# Patient Record
Sex: MALE | Race: OTHER | HISPANIC OR LATINO | URBAN - METROPOLITAN AREA
[De-identification: names, ages, dates, MRNs, and addresses within clinical notes are randomized per-mention and may not be internally consistent; named-entity substitution may affect disease eponyms.]

---

## 2020-02-04 ENCOUNTER — EMERGENCY (EMERGENCY)
Facility: HOSPITAL | Age: 45
LOS: 1 days | Discharge: HOME | End: 2020-02-04
Attending: EMERGENCY MEDICINE | Admitting: EMERGENCY MEDICINE
Payer: MEDICAID

## 2020-02-04 VITALS
HEART RATE: 75 BPM | DIASTOLIC BLOOD PRESSURE: 97 MMHG | RESPIRATION RATE: 16 BRPM | TEMPERATURE: 98 F | SYSTOLIC BLOOD PRESSURE: 166 MMHG | OXYGEN SATURATION: 100 %

## 2020-02-04 LAB
ALBUMIN SERPL ELPH-MCNC: 4.1 G/DL — SIGNIFICANT CHANGE UP (ref 3.3–5)
ALBUMIN SERPL ELPH-MCNC: 4.2 G/DL — SIGNIFICANT CHANGE UP (ref 3.3–5)
ALP SERPL-CCNC: 52 U/L — SIGNIFICANT CHANGE UP (ref 40–120)
ALP SERPL-CCNC: 61 U/L — SIGNIFICANT CHANGE UP (ref 40–120)
ALT FLD-CCNC: 36 U/L — SIGNIFICANT CHANGE UP (ref 4–41)
ALT FLD-CCNC: SIGNIFICANT CHANGE UP U/L (ref 4–41)
ANION GAP SERPL CALC-SCNC: 15 MMO/L — HIGH (ref 7–14)
ANION GAP SERPL CALC-SCNC: 16 MMO/L — HIGH (ref 7–14)
AST SERPL-CCNC: 39 U/L — SIGNIFICANT CHANGE UP (ref 4–40)
AST SERPL-CCNC: SIGNIFICANT CHANGE UP U/L (ref 4–40)
BASOPHILS # BLD AUTO: 0.12 K/UL — SIGNIFICANT CHANGE UP (ref 0–0.2)
BASOPHILS NFR BLD AUTO: 1.2 % — SIGNIFICANT CHANGE UP (ref 0–2)
BILIRUB SERPL-MCNC: 0.4 MG/DL — SIGNIFICANT CHANGE UP (ref 0.2–1.2)
BILIRUB SERPL-MCNC: 0.4 MG/DL — SIGNIFICANT CHANGE UP (ref 0.2–1.2)
BUN SERPL-MCNC: 12 MG/DL — SIGNIFICANT CHANGE UP (ref 7–23)
BUN SERPL-MCNC: 12 MG/DL — SIGNIFICANT CHANGE UP (ref 7–23)
CALCIUM SERPL-MCNC: 9.5 MG/DL — SIGNIFICANT CHANGE UP (ref 8.4–10.5)
CALCIUM SERPL-MCNC: 9.6 MG/DL — SIGNIFICANT CHANGE UP (ref 8.4–10.5)
CHLORIDE SERPL-SCNC: 101 MMOL/L — SIGNIFICANT CHANGE UP (ref 98–107)
CHLORIDE SERPL-SCNC: 103 MMOL/L — SIGNIFICANT CHANGE UP (ref 98–107)
CO2 SERPL-SCNC: 16 MMOL/L — LOW (ref 22–31)
CO2 SERPL-SCNC: 21 MMOL/L — LOW (ref 22–31)
CREAT SERPL-MCNC: 0.79 MG/DL — SIGNIFICANT CHANGE UP (ref 0.5–1.3)
CREAT SERPL-MCNC: 0.95 MG/DL — SIGNIFICANT CHANGE UP (ref 0.5–1.3)
EOSINOPHIL # BLD AUTO: 0.15 K/UL — SIGNIFICANT CHANGE UP (ref 0–0.5)
EOSINOPHIL NFR BLD AUTO: 1.5 % — SIGNIFICANT CHANGE UP (ref 0–6)
GLUCOSE SERPL-MCNC: 112 MG/DL — HIGH (ref 70–99)
GLUCOSE SERPL-MCNC: 83 MG/DL — SIGNIFICANT CHANGE UP (ref 70–99)
HBA1C BLD-MCNC: 5.6 % — SIGNIFICANT CHANGE UP (ref 4–5.6)
HCT VFR BLD CALC: 49.1 % — SIGNIFICANT CHANGE UP (ref 39–50)
HGB BLD-MCNC: 16.3 G/DL — SIGNIFICANT CHANGE UP (ref 13–17)
IMM GRANULOCYTES NFR BLD AUTO: 0.4 % — SIGNIFICANT CHANGE UP (ref 0–1.5)
LYMPHOCYTES # BLD AUTO: 2.39 K/UL — SIGNIFICANT CHANGE UP (ref 1–3.3)
LYMPHOCYTES # BLD AUTO: 23.1 % — SIGNIFICANT CHANGE UP (ref 13–44)
MCHC RBC-ENTMCNC: 28.6 PG — SIGNIFICANT CHANGE UP (ref 27–34)
MCHC RBC-ENTMCNC: 33.2 % — SIGNIFICANT CHANGE UP (ref 32–36)
MCV RBC AUTO: 86.1 FL — SIGNIFICANT CHANGE UP (ref 80–100)
MONOCYTES # BLD AUTO: 0.84 K/UL — SIGNIFICANT CHANGE UP (ref 0–0.9)
MONOCYTES NFR BLD AUTO: 8.1 % — SIGNIFICANT CHANGE UP (ref 2–14)
NEUTROPHILS # BLD AUTO: 6.79 K/UL — SIGNIFICANT CHANGE UP (ref 1.8–7.4)
NEUTROPHILS NFR BLD AUTO: 65.7 % — SIGNIFICANT CHANGE UP (ref 43–77)
NRBC # FLD: 0 K/UL — SIGNIFICANT CHANGE UP (ref 0–0)
PLATELET # BLD AUTO: 321 K/UL — SIGNIFICANT CHANGE UP (ref 150–400)
PMV BLD: 9.4 FL — SIGNIFICANT CHANGE UP (ref 7–13)
POTASSIUM SERPL-MCNC: 4.2 MMOL/L — SIGNIFICANT CHANGE UP (ref 3.5–5.3)
POTASSIUM SERPL-MCNC: SIGNIFICANT CHANGE UP MMOL/L (ref 3.5–5.3)
POTASSIUM SERPL-SCNC: 4.2 MMOL/L — SIGNIFICANT CHANGE UP (ref 3.5–5.3)
POTASSIUM SERPL-SCNC: SIGNIFICANT CHANGE UP MMOL/L (ref 3.5–5.3)
PROT SERPL-MCNC: 6.9 G/DL — SIGNIFICANT CHANGE UP (ref 6–8.3)
PROT SERPL-MCNC: SIGNIFICANT CHANGE UP G/DL (ref 6–8.3)
RBC # BLD: 5.7 M/UL — SIGNIFICANT CHANGE UP (ref 4.2–5.8)
RBC # FLD: 12.6 % — SIGNIFICANT CHANGE UP (ref 10.3–14.5)
SODIUM SERPL-SCNC: 133 MMOL/L — LOW (ref 135–145)
SODIUM SERPL-SCNC: 139 MMOL/L — SIGNIFICANT CHANGE UP (ref 135–145)
TROPONIN T, HIGH SENSITIVITY: < 6 NG/L — SIGNIFICANT CHANGE UP (ref ?–14)
WBC # BLD: 10.33 K/UL — SIGNIFICANT CHANGE UP (ref 3.8–10.5)
WBC # FLD AUTO: 10.33 K/UL — SIGNIFICANT CHANGE UP (ref 3.8–10.5)

## 2020-02-04 PROCEDURE — 99218: CPT

## 2020-02-04 PROCEDURE — 70450 CT HEAD/BRAIN W/O DYE: CPT | Mod: 26

## 2020-02-04 RX ORDER — SODIUM CHLORIDE 9 MG/ML
1000 INJECTION INTRAMUSCULAR; INTRAVENOUS; SUBCUTANEOUS ONCE
Refills: 0 | Status: COMPLETED | OUTPATIENT
Start: 2020-02-04 | End: 2020-02-04

## 2020-02-04 RX ORDER — MECLIZINE HCL 12.5 MG
50 TABLET ORAL ONCE
Refills: 0 | Status: COMPLETED | OUTPATIENT
Start: 2020-02-04 | End: 2020-02-04

## 2020-02-04 RX ORDER — ACETAMINOPHEN 500 MG
650 TABLET ORAL ONCE
Refills: 0 | Status: COMPLETED | OUTPATIENT
Start: 2020-02-04 | End: 2020-02-04

## 2020-02-04 RX ORDER — SODIUM CHLORIDE 9 MG/ML
1000 INJECTION INTRAMUSCULAR; INTRAVENOUS; SUBCUTANEOUS
Refills: 0 | Status: DISCONTINUED | OUTPATIENT
Start: 2020-02-04 | End: 2020-02-11

## 2020-02-04 RX ORDER — METOCLOPRAMIDE HCL 10 MG
10 TABLET ORAL ONCE
Refills: 0 | Status: COMPLETED | OUTPATIENT
Start: 2020-02-04 | End: 2020-02-04

## 2020-02-04 RX ADMIN — Medication 650 MILLIGRAM(S): at 22:45

## 2020-02-04 RX ADMIN — Medication 650 MILLIGRAM(S): at 18:25

## 2020-02-04 RX ADMIN — Medication 650 MILLIGRAM(S): at 19:30

## 2020-02-04 RX ADMIN — Medication 10 MILLIGRAM(S): at 18:25

## 2020-02-04 RX ADMIN — SODIUM CHLORIDE 125 MILLILITER(S): 9 INJECTION INTRAMUSCULAR; INTRAVENOUS; SUBCUTANEOUS at 18:25

## 2020-02-04 RX ADMIN — SODIUM CHLORIDE 1000 MILLILITER(S): 9 INJECTION INTRAMUSCULAR; INTRAVENOUS; SUBCUTANEOUS at 15:28

## 2020-02-04 RX ADMIN — Medication 650 MILLIGRAM(S): at 21:50

## 2020-02-04 RX ADMIN — Medication 50 MILLIGRAM(S): at 18:25

## 2020-02-04 NOTE — ED ADULT NURSE NOTE - CHIEF COMPLAINT QUOTE
Patient BIB EMS for dizziness x5 days, worsens when moving hid head side to side.  Patient ambulatory with steady gait, negative limb ataxia, pronator drift, slurred speech.  Denies PMH.  ECG completed.

## 2020-02-04 NOTE — ED PROVIDER NOTE - ATTENDING CONTRIBUTION TO CARE
DR. MAE, ATTENDING MD-  I have reviewed and discussed the medical student’s documentation and findings with the student. After personally examining the patient, my findings have been added to this documentation.

## 2020-02-04 NOTE — ED PROVIDER NOTE - NS ED ROS FT
CONSTITUTIONAL: No weakness, fevers or chills  EYES/ENT: No visual changes;  No throat pain   NECK: Pain at the back of his head an in his neck   RESPIRATORY: No cough, wheezing, hemoptysis; No shortness of breath  CARDIOVASCULAR: No chest pain, no palpitations  GASTROINTESTINAL: No abdominal or epigastric pain. No nausea, vomiting, or hematemesis; No diarrhea or constipation. No melena or hematochezia.  GENITOURINARY: No dysuria, frequency or hematuria  NEUROLOGICAL: No numbness or weakness  SKIN: No itching, rashes

## 2020-02-04 NOTE — ED CDU PROVIDER INITIAL DAY NOTE - OBJECTIVE STATEMENT
ID # [id number]:  45 y/o male with no pmhx presents to ED c/o gradual onset headache x 4 days and dizziness today.  ID # [id number]:  45 y/o male with no pmhx presents to ED c/o gradual onset headache x 4 days and dizziness today. Describes "room spinning" since 7am this morning that has been constant. Worse with sitting up. + n/v. Posterior headache. No fever, chills, neck pain, cp, sob, abd pain, weakness, numbness, tingling, ataxia, ear pain, family hx of CVA, worst headache of his life. +smoker, about 2-3 cigarettes per day.

## 2020-02-04 NOTE — ED PROVIDER NOTE - CLINICAL SUMMARY MEDICAL DECISION MAKING FREE TEXT BOX
43 yo M with Hx of headaches p/w headache that is different that prior headaches with dizziness and a feeling that he is diverting to the left. Will check head CT, labs, pain control. Will obtain neuro consult and send the pt to the CDU for MRI/MRA to evaluate for posterior CVA vs. mass.

## 2020-02-04 NOTE — ED ADULT NURSE NOTE - OBJECTIVE STATEMENT
Pt is primarily Moroccan speaking. Able to communicate with patient in Moroccan as RW RN. Pt is awake/alert. Aox4, ambulatory at baseline. Pt has been having dizziness x 5 days and having difficulty sleeping. Also c/o lower neck pain. No neuro deficits at this time. Speech clear. Face appears symmetrical. Denies pmhx. Pt evaluated by MD and neurologist. To be placed in CDU for MRI.

## 2020-02-04 NOTE — ED ADULT TRIAGE NOTE - CHIEF COMPLAINT QUOTE
Patient BIB EMS for dizziness x5 days, worsens when moving hid head side to side.  Patient ambulatory with steady gait, negative limb ataxia, pronator drift, slurred speech.  Denies PMH. Patient BIB EMS for dizziness x5 days, worsens when moving hid head side to side.  Patient ambulatory with steady gait, negative limb ataxia, pronator drift, slurred speech.  Denies PMH.  ECG completed.

## 2020-02-04 NOTE — ED CDU PROVIDER INITIAL DAY NOTE - ATTENDING CONTRIBUTION TO CARE
Dr. Monaco:  I performed a face to face bedside interview with patient regarding history of present illness, review of symptoms and past medical history. I completed an independent physical exam.  I have discussed patient's plan of care with PA.   I agree with note as stated above, having amended the EMR as needed to reflect my findings.   This includes HISTORY OF PRESENT ILLNESS, HIV, PAST MEDICAL/SURGICAL/FAMILY/SOCIAL HISTORY, ALLERGIES AND HOME MEDICATIONS, REVIEW OF SYSTEMS, PHYSICAL EXAM, and any PROGRESS NOTES during the time I functioned as the attending physician for this patient.    44M presented to ED with dizziness x 1 day.  Pt endorses occipital headache and posterior neck pain over past several days (different than usual frontal headaches).  Today, had episode of feeling severe dizziness described as feeling mostly off balance.  +Associated N/V.  Now symptoms eased off but still feeling "woozy" as though on a boat.  When ambulating, feels like he is tipping left.  Denies fever/chills, cp, sob, tinnitus, ear pain, current n/v.    Exam:  - nad  - rrr  - ctab  - abd soft ntnd  - no focal neuro deficits, fatiguable horizontal nystagmus, stable gait, negative Romberg sign.    A/P  - vertigo, r/o central cause  - CDU for MRI  - f/u neuro recs Dr. Monaco:  I performed a face to face bedside interview with patient regarding history of present illness, review of symptoms and past medical history. I completed an independent physical exam.  I have discussed patient's plan of care with PA.   I agree with note as stated above, having amended the EMR as needed to reflect my findings.   This includes HISTORY OF PRESENT ILLNESS, HIV, PAST MEDICAL/SURGICAL/FAMILY/SOCIAL HISTORY, ALLERGIES AND HOME MEDICATIONS, REVIEW OF SYSTEMS, PHYSICAL EXAM, and any PROGRESS NOTES during the time I functioned as the attending physician for this patient.    44M presented to ED with dizziness x 1 day.  Pt endorses occipital headache and posterior neck pain over past several days (different than usual frontal headaches).  Today, had episode of feeling severe dizziness described as feeling mostly off balance.  +Associated N/V.  Now symptoms eased off but still feeling "woozy" as though on a boat.  When ambulating, feels like he is tipping left.  Denies fever/chills, cp, sob, tinnitus, ear pain, current n/v    Exam:  - nad  - rrr  - ctab  - abd soft ntnd  - no focal neuro deficits, fatiguable horizontal nystagmus, stable gait, negative Romberg sign.    A/P  - vertigo, r/o central cause  - CDU for MRI  - f/u neuro recs

## 2020-02-04 NOTE — CONSULT NOTE ADULT - ATTENDING COMMENTS
45 y/o male with past medical history of migraines presents to the ED with complaint of dizziness, neck pain and headache for the past 5 days.   Pt received reglan and tylenol in the CDU. This morning reports that neck pain has completely resolved, still has some minor right sided headache which is usual for his migraines. He reports getting HA about every 3 days, takes 2 Excedrin for the pain.    Neurological exam unremarkable.     MRI brain and c-spine negative.     Pt with intractable migraine.   will start topamax 50mg BID for headache ppx treatment.   Flexeril 5mg 1-2 tabs PRN neck pain.    Follow up in neurology clinic at [place] [phone]

## 2020-02-04 NOTE — ED CDU PROVIDER INITIAL DAY NOTE - ENMT, MLM
Airway patent. Nasal mucosa clear. Mouth with normal mucosa. Throat has no vesicles, no oropharyngeal exudates and uvula is midline. Airway patent. Nasal mucosa clear. Mouth with normal mucosa. Throat has no vesicles, no oropharyngeal exudates and uvula is midline. Ears: Grey TMs b/l.

## 2020-02-04 NOTE — CONSULT NOTE ADULT - ASSESSMENT
45 y/o male with past medical history of migraines presents to the ED with complaint of dizziness and headache. Patient reports that for the past 5 days, he developed a posterior neck pain that radiated up to the back of his head rate 6/10 in severity. Patient reports the headache was not accompanied with any nausea, vomiting, photophobia, or phonophobia. Patient also reports there no positional component to the headache and that he found moderate relief with ibuprofen. However, on 2/4 morning around 7:30am, patient had a 2-3 minute episode of severe lightheadedness while working that caused him to have to sit down. During this episode, patient reports he felt nauseous and vomited x2. After this, patient was noted to feel off-balance when ambulating but did not have room spinning dizziness. Patient's family reports patient has been drifting to the left while ambulating since then. Patient does endorse prior history of migraines but denies ever having associated dizziness or feeling off balance as a result.   Currently, denies any changes in vision, problems speaking, numbness/tingling, unilateral weakness, facial numbness, double vision, problems swallowing. Denies any preceding trauma, heavy lifting, straining, or MVC prior to headache onset.   Neurologic exam unremarkable except for +slightly wide based gait a/w L lateropulsion noted on exam. Patient's VSS also noted to be 166/97 in ED despite no prior history of HTN.   CTH unremarkable for acute pathology.    Impression: Headache a/w L lateropulsion possibly 2/2 L cerebellar dysfunction; possibly ischemic stroke 2/2 cryptogenic etiology. Would concurrently evaluate the vasculature given atypical location from patient's prior headaches with concern (though less likely) for vascular dissection. Basilar migraine would be less likely and, additionally, a diagnosis of exclusion in this case.     Recommendations:  [] MRI brain w/o contrast  [] MRA head w/o contrast  [] MRA neck w/ contrast  [] IVF  [] Reglan 10mg IV Q8H PRN for nausea  [] IV Tylenol for headache  [] Observation Unit

## 2020-02-04 NOTE — ED CDU PROVIDER INITIAL DAY NOTE - MEDICAL DECISION MAKING DETAILS
45 y/o male with no pmhx presents to ED c/o gradual onset headache x 4 days and dizziness today. Describes "room spinning" since 7am this morning that has been constant. Worse with sitting up. + n/v. Posterior headache. ct head negative, pt c/o persistent symptoms. pt did not received medication, will try tylenol, meclizine and reglan. sent to cdu for r/o cva MRI, neuro following.

## 2020-02-04 NOTE — CONSULT NOTE ADULT - SUBJECTIVE AND OBJECTIVE BOX
Neurology Consult Note    HPI: 43 y/o male with past medical history of migraines presents to the ED with complaint of dizziness and headache. Patient reports that for the past 5 days, he developed a posterior neck pain that radiated up to the back of his head rate 6/10 in severity. Patient reports the headache was not accompanied with any nausea, vomiting, photophobia, or phonophobia. Patient also reports there no positional component to the headache and that he found moderate relief with ibuprofen. However, on 2/4 morning around 7:30am, patient had a 2-3 minute episode of severe lightheadedness while working that caused him to have to sit down. During this episode, patient reports he felt nauseous and vomited x2. After this, patient was noted to feel off-balance when ambulating but did not have room spinning dizziness. Patient's family reports patient has been drifting to the left while ambulating since then. Patient does endorse prior history of migraines but denies ever having associated dizziness or feeling off balance as a result.   Currently, denies any changes in vision, problems speaking, numbness/tingling, unilateral weakness, facial numbness, double vision, problems swallowing.       MEDICATIONS  (STANDING):  sodium chloride 0.9% Bolus 1000 milliLiter(s) IV Bolus once    MEDICATIONS  (PRN):    PAST MEDICAL & SURGICAL HISTORY:    FAMILY HISTORY:    Allergies    No Known Allergies    Intolerances    Review of Systems:  As per HPI, otherwise negative.     Vital Signs Last 24 Hrs  T(C): 36.9 (04 Feb 2020 13:11), Max: 36.9 (04 Feb 2020 13:11)  T(F): 98.5 (04 Feb 2020 13:11), Max: 98.5 (04 Feb 2020 13:11)  HR: 77 (04 Feb 2020 13:11) (75 - 77)  BP: 145/90 (04 Feb 2020 13:11) (145/90 - 166/97)  BP(mean): --  RR: 16 (04 Feb 2020 13:11) (16 - 16)  SpO2: 99% (04 Feb 2020 13:11) (99% - 100%)    General Exam:   General appearance: No acute distress               Neurological Exam:  Mental Status: Orientated to self, date and place.  Attention intact.  No dysarthria. Speech fluent.  Cranial Nerves:   PERRL, EOMI, VFF, no nystagmus. CN V1-3 intact to light touch. No facial asymmetry. Tongue, uvula and palate midline.  Sternocleidomastoid and Trapezius intact bilaterally.    Motor:   Tone: normal.                  Strength:      [] Upper extremity                      Delt       Bicep    Tricep                                                  R         5/5        5/5        5/5       5/5                                               L          5/5        5/5        5/5       5/5  [] Lower extremity                       HF          KE          KF        DF         PF                                               R        5/5 5/5 5/5 5/5       5/5                                               L         5/5 5/5       5/5       5/5        5/5  Pronator drift: none                 Dysmetria: None to finger-nose-finger  Tremor: No resting, postural or action tremor.  No myoclonus.    Sensation: intact to light touch    Deep Tendon Reflexes:     Biceps          Triceps      BR        Patellar        Ankle         Babinski                                         R       2+                   2+           2+            2+               2+           downgoing                                         L        2+                  2+           2+            2+               2+           downgoing    Gait: +Wide based. Left Lateropulsion.      Other:    02-04    139  |  103  |  12  ----------------------------<  112<H>  4.2   |  21<L>  |  0.95    Ca    9.5      04 Feb 2020 13:30    TPro  6.9  /  Alb  4.1  /  TBili  0.4  /  DBili  x   /  AST  39  /  ALT  36  /  AlkPhos  61  02-04          Radiology    CT:   MRI  EKG:  tele:  TTE:  EEG:

## 2020-02-04 NOTE — ED PROVIDER NOTE - PHYSICAL EXAMINATION
GENERAL: NAD, well-developed  HEAD:  Atraumatic, Normocephalic  EYES: EOMI, PERRLA, conjunctiva and sclera clear  NECK: Supple, No JVD  CHEST/LUNG: Clear to auscultation bilaterally; No wheeze  HEART: Regular rate and rhythm; No murmurs, rubs, or gallops  ABDOMEN: Soft, Nontender, Nondistended; Bowel sounds present  EXTREMITIES:  2+ Peripheral Pulses, No clubbing, cyanosis, or edema  PSYCH: AAOx3  NEUROLOGY: CN II-XII intact, no weakness in any extremity, sensation intact to light touch, no dysdiadochokinesia   SKIN: No rashes or lesions

## 2020-02-04 NOTE — ED PROVIDER NOTE - OBJECTIVE STATEMENT
The patient is a 44-year-old man with a past medical history of headaches who presents to the emergency department with a chief complaint of dizziness. He states that he has had a new headache for the past five days at the back of his head and in his neck that has been present constantly. It is different from his typical headaches, which are periorbital. This morning when the patient was standing about to leave for work, he felt a bit dizzy. He did not feel room-spinning dizziness, but rather, off-balance. His dizziness is not made worse when he turns his neck. It is present constantly at a low level. It is not associated with standing from a seated position. It is not associated with micturition or with defecation. He has not experienced any palpitations, chest pain, or vomiting. This morning, he did feel short of breath, and he did notice weakness in his limbs. The patient smokes about one-to-two cigarettes daily while he is working. He does not have any known personal or family history of cardiovascular disease.

## 2020-02-05 VITALS
OXYGEN SATURATION: 97 % | HEART RATE: 66 BPM | DIASTOLIC BLOOD PRESSURE: 89 MMHG | TEMPERATURE: 98 F | RESPIRATION RATE: 17 BRPM | SYSTOLIC BLOOD PRESSURE: 148 MMHG

## 2020-02-05 PROCEDURE — 70551 MRI BRAIN STEM W/O DYE: CPT | Mod: 26

## 2020-02-05 PROCEDURE — 99283 EMERGENCY DEPT VISIT LOW MDM: CPT | Mod: GC

## 2020-02-05 PROCEDURE — 70548 MR ANGIOGRAPHY NECK W/DYE: CPT | Mod: 26

## 2020-02-05 PROCEDURE — 99217: CPT

## 2020-02-05 RX ORDER — TOPIRAMATE 25 MG
2 TABLET ORAL
Qty: 56 | Refills: 0
Start: 2020-02-05 | End: 2020-02-18

## 2020-02-05 RX ORDER — IBUPROFEN 200 MG
1 TABLET ORAL
Qty: 30 | Refills: 0
Start: 2020-02-05

## 2020-02-05 RX ORDER — METOCLOPRAMIDE HCL 10 MG
10 TABLET ORAL ONCE
Refills: 0 | Status: COMPLETED | OUTPATIENT
Start: 2020-02-05 | End: 2020-02-05

## 2020-02-05 RX ORDER — TOPIRAMATE 25 MG
1 TABLET ORAL
Qty: 28 | Refills: 0
Start: 2020-02-05 | End: 2020-02-18

## 2020-02-05 RX ORDER — CYCLOBENZAPRINE HYDROCHLORIDE 10 MG/1
1 TABLET, FILM COATED ORAL
Qty: 30 | Refills: 0
Start: 2020-02-05

## 2020-02-05 RX ADMIN — SODIUM CHLORIDE 125 MILLILITER(S): 9 INJECTION INTRAMUSCULAR; INTRAVENOUS; SUBCUTANEOUS at 04:36

## 2020-02-05 RX ADMIN — SODIUM CHLORIDE 1000 MILLILITER(S): 9 INJECTION INTRAMUSCULAR; INTRAVENOUS; SUBCUTANEOUS at 12:34

## 2020-02-05 RX ADMIN — Medication 10 MILLIGRAM(S): at 07:47

## 2020-02-05 NOTE — ED CDU PROVIDER SUBSEQUENT DAY NOTE - PHYSICAL EXAMINATION
Well appearing, well nourished, awake, alert, oriented to person, place, time/situation and in no apparent distress.    Airway patent    Eyes without scleral injection. No jaundice.    Strong pulse.    Respirations unlabored.    Abdomen soft, non-tender, no guarding.    Spine appears normal, range of motion is not limited, no muscle or joint tenderness    Alert and oriented, no gross motor or sensory deficits. Gait unremarkable.     Skin normal color for race, warm, dry and intact. No evidence of rash.    No SI/HI.

## 2020-02-05 NOTE — ED CDU PROVIDER SUBSEQUENT DAY NOTE - PROGRESS NOTE DETAILS
Pt reassessed, feeling better, seen by neuro who recommend Topamax 50mg BID for migraine prevention, [id number] prn for muscle spasms. Pt to follow up in neuro clinic. WIll recommend up-titrating Topamax slowly over a month span.

## 2020-02-05 NOTE — ED CDU PROVIDER SUBSEQUENT DAY NOTE - HISTORY
Pee: H/o migraines. Felt posterior HA, vertigo, gait imbalance. CT and MRI brain unremarkable. Feels better w/ Reglan and Antivert. Seen by Neuro.

## 2020-02-05 NOTE — ED CDU PROVIDER DISPOSITION NOTE - PATIENT PORTAL LINK FT
You can access the FollowMyHealth Patient Portal offered by [facility] by registering at the following website: [email or web address]. By joining [facility]’s FollowMyHealth portal, you will also be able to view your health information using other applications (apps) compatible with our system.

## 2020-02-05 NOTE — ED CDU PROVIDER SUBSEQUENT DAY NOTE - ATTENDING CONTRIBUTION TO CARE
I performed a face-to-face evaluation of the patient and performed a history and physical examination. I agree with the history and physical examination.    Pee: H/o migraines. Felt posterior HA, vertigo, gait imbalance. CT and MRI brain unremarkable. Feels better w/ Reglan and Antivert. Seen by Neuro. Dc home.

## 2020-02-05 NOTE — ED CDU PROVIDER SUBSEQUENT DAY NOTE - MEDICAL DECISION MAKING DETAILS
Pee: H/o migraines. Felt posterior HA, vertigo, gait imbalance. CT and MRI brain unremarkable. Feels better w/ Reglan and Antivert. Seen by Neuro. Dc home.
